# Patient Record
Sex: FEMALE | Race: WHITE | NOT HISPANIC OR LATINO | Employment: FULL TIME | URBAN - METROPOLITAN AREA
[De-identification: names, ages, dates, MRNs, and addresses within clinical notes are randomized per-mention and may not be internally consistent; named-entity substitution may affect disease eponyms.]

---

## 2017-05-29 ENCOUNTER — OFFICE VISIT (OUTPATIENT)
Dept: URGENT CARE | Facility: PHYSICIAN GROUP | Age: 42
End: 2017-05-29
Payer: COMMERCIAL

## 2017-05-29 VITALS
OXYGEN SATURATION: 96 % | HEIGHT: 65 IN | SYSTOLIC BLOOD PRESSURE: 142 MMHG | HEART RATE: 96 BPM | WEIGHT: 270 LBS | TEMPERATURE: 96.9 F | DIASTOLIC BLOOD PRESSURE: 98 MMHG | BODY MASS INDEX: 44.98 KG/M2

## 2017-05-29 DIAGNOSIS — K04.7 DENTAL INFECTION: ICD-10-CM

## 2017-05-29 PROCEDURE — 99204 OFFICE O/P NEW MOD 45 MIN: CPT | Performed by: PHYSICIAN ASSISTANT

## 2017-05-29 RX ORDER — CLINDAMYCIN HYDROCHLORIDE 150 MG/1
450 CAPSULE ORAL 3 TIMES DAILY
Qty: 63 CAP | Refills: 0 | Status: SHIPPED | OUTPATIENT
Start: 2017-05-29 | End: 2017-06-05

## 2017-05-29 RX ORDER — TRAMADOL HYDROCHLORIDE 50 MG/1
50 TABLET ORAL EVERY 6 HOURS PRN
Qty: 15 TAB | Refills: 0 | Status: SHIPPED | OUTPATIENT
Start: 2017-05-29 | End: 2017-11-30

## 2017-05-29 ASSESSMENT — ENCOUNTER SYMPTOMS
CHILLS: 0
WHEEZING: 0
SHORTNESS OF BREATH: 0
SORE THROAT: 0
PALPITATIONS: 0
FEVER: 0
HEADACHES: 0
COUGH: 0

## 2017-05-29 NOTE — MR AVS SNAPSHOT
"        Lesley Sotomayor   2017 9:35 AM   Office Visit   MRN: [id number]    Department:  [facility]   Dept Phone:  [phone]    Description:  Female : 1975   Provider:  Christopher Sewell PA-C           Reason for Visit     Sinus Problem sinus and head pressure, right ear pain and mouth feels infected on and off 1.5weeks      Allergies as of 2017     Allergen Noted Reactions    Penicillins 2017         You were diagnosed with     Dental infection   [157478]         Vital Signs     Blood Pressure Pulse Temperature Height Weight Body Mass Index    142/98 mmHg 96 36.1 °C (96.9 °F) 1.651 m (5' 5\") 122.471 kg (270 lb) 44.93 kg/m2    Oxygen Saturation                   96%           Basic Information     Date Of Birth Sex Race Ethnicity Preferred Language    1975 Female White Non- English      Health Maintenance        Date Due Completion Dates    IMM DTaP/Tdap/Td Vaccine (1 - Tdap) 3/20/1995 ---    PAP SMEAR 3/20/1997 ---    MAMMOGRAM 3/20/2016 ---            Current Immunizations     Influenza Vaccine Quad Inj (Preserved) 2014      Below and/or attached are the medications your provider expects you to take. Review all of your home medications and newly ordered medications with your provider and/or pharmacist. Follow medication instructions as directed by your provider and/or pharmacist. Please keep your medication list with you and share with your provider. Update the information when medications are discontinued, doses are changed, or new medications (including over-the-counter products) are added; and carry medication information at all times in the event of emergency situations     Allergies:  PENICILLINS - (reactions not documented)               Medications  Valid as of: May 29, 2017 - 10:37 AM    Generic Name Brand Name Tablet Size Instructions for use    Clindamycin HCl (Cap) CLEOCIN 150 MG Take 3 Caps by mouth 3 times a day for 7 days.        TraMADol HCl (Tab) " ULTRAM 50 MG Take 1 Tab by mouth every 6 hours as needed.        .                 Medicines prescribed today were sent to:     [id number] - [place], [place] - [address]    [address] [place] [address]    Phone: [phone] Fax: [phone]    Open 24 Hours?: No      Medication refill instructions:       If your prescription bottle indicates you have medication refills left, it is not necessary to call your provider’s office. Please contact your pharmacy and they will refill your medication.    If your prescription bottle indicates you do not have any refills left, you may request refills at any time through one of the following ways: The online [employer] system (except Urgent Care), by calling your provider’s office, or by asking your pharmacy to contact your provider’s office with a refill request. Medication refills are processed only during regular business hours and may not be available until the next business day. Your provider may request additional information or to have a follow-up visit with you prior to refilling your medication.   *Please Note: Medication refills are assigned a new Rx number when refilled electronically. Your pharmacy may indicate that no refills were authorized even though a new prescription for the same medication is available at the pharmacy. Please request the medicine by name with the pharmacy before contacting your provider for a refill.           [employer] Access Code: [email or web address]  Expires: 6/28/2017 10:37 AM    Your email address is not on file at [facility].  Email Addresses are required for you to sign up for [employer], please contact [phone] to verify your personal information and to provide your email address prior to attempting to register for [employer].    Lesley Sotomayor  [address] [place] [address]    [employer]  A secure, online tool to manage your health information     [facility]’s [employer]® is a secure, online tool that connects you to your  personalized health information from the privacy of your home -- day or night - making it very easy for you to manage your healthcare. Once the activation process is completed, you can even access your medical information using the [employer] katherine, which is available for free in the Apple Katherine store or Google Play store.     To learn more about [employer], visit [email or web address]    There are two levels of access available (as shown below):   My Chart Features  [facility]  Specialists [facility]  Urgent  Care Non-[facility] Doctor   Email your healthcare team securely and privately 24/7 X X X    Manage appointments: schedule your next appointment; view details of past/upcoming appointments X      Request prescription refills. X      View recent personal medical records, including lab and immunizations X X X X   View health record, including health history, allergies, medications X X X X   Read reports about your outpatient visits, procedures, consult and ER notes X X X X   See your discharge summary, which is a recap of your hospital and/or ER visit that includes your diagnosis, lab results, and care plan X X  X     How to register for [employer]:  Once your e-mail address has been verified, follow the following steps to sign up for [employer].     1. Go to  [email or web address]  2. Click on the Sign Up Now box, which takes you to the New Member Sign Up page. You will need to provide the following information:  a. Enter your [employer] Access Code exactly as it appears at the top of this page. (You will not need to use this code after you’ve completed the sign-up process. If you do not sign up before the expiration date, you must request a new code.)   b. Enter your date of birth.   c. Enter your home email address.   d. Click Submit, and follow the next screen’s instructions.  3. Create a [employer] ID. This will be your [employer] login ID and cannot be changed, so think of one that is secure and  easy to remember.  4. Create a [employer] password. You can change your password at any time.  5. Enter your Password Reset Question and Answer. This can be used at a later time if you forget your password.   6. Enter your e-mail address. This allows you to receive e-mail notifications when new information is available in [employer].  7. Click Sign Up. You can now view your health information.    For assistance activating your [employer] account, call [phone]

## 2017-05-29 NOTE — PROGRESS NOTES
"Subjective:      Lesley Sotomayor is a 42 y.o. female who presents with Sinus Problem            Dental Pain   This is a new problem. The current episode started in the past 7 days. The problem occurs constantly. The problem has been unchanged. The pain is moderate. Associated symptoms include facial pain. Pertinent negatives include no fever. She has tried NSAIDs for the symptoms. The treatment provided no relief.       Review of Systems   Constitutional: Negative for fever and chills.   HENT: Negative for congestion, ear pain and sore throat.         Mouth pain     Respiratory: Negative for cough, shortness of breath and wheezing.    Cardiovascular: Negative for chest pain and palpitations.   Neurological: Negative for headaches.   All other systems reviewed and are negative.  PMH:  has no past medical history on file.  MEDS:   Current outpatient prescriptions:   •  tramadol (ULTRAM) 50 MG Tab, Take 1 Tab by mouth every 6 hours as needed., Disp: 15 Tab, Rfl: 0  •  clindamycin (CLEOCIN) 150 MG Cap, Take 3 Caps by mouth 3 times a day for 7 days., Disp: 63 Cap, Rfl: 0  ALLERGIES:   Allergies   Allergen Reactions   • Penicillins      SURGHX: History reviewed. No pertinent past surgical history.  SOCHX:    FH: Family history was reviewed, no pertinent findings to report  Medications, Allergies, and current problem list reviewed today in Epic         Objective:     /98 mmHg  Pulse 96  Temp(Src) 36.1 °C (96.9 °F)  Ht 1.651 m (5' 5\")  Wt 122.471 kg (270 lb)  BMI 44.93 kg/m2  SpO2 96%     Physical Exam   Constitutional: She is oriented to person, place, and time. She appears well-developed and well-nourished.   HENT:   Head: Normocephalic and atraumatic.   Right Ear: Hearing, tympanic membrane, external ear and ear canal normal.   Left Ear: Hearing, tympanic membrane, external ear and ear canal normal.   Nose: Nose normal.   Mouth/Throat: Uvula is midline, oropharynx is clear and moist and mucous membranes are " normal. Dental caries present. No dental abscesses.       Neck: Normal range of motion. Neck supple.   Cardiovascular: Normal rate, regular rhythm and normal heart sounds.  Exam reveals no gallop and no friction rub.    No murmur heard.  Pulmonary/Chest: Effort normal and breath sounds normal. No accessory muscle usage. No apnea, no tachypnea and no bradypnea. No respiratory distress. She has no decreased breath sounds. She has no wheezes. She has no rhonchi. She has no rales. She exhibits no tenderness.   Neurological: She is alert and oriented to person, place, and time.   Skin: Skin is warm and dry.   Psychiatric: She has a normal mood and affect. Her behavior is normal. Judgment and thought content normal.   Vitals reviewed.              Assessment/Plan:     1. Dental infection    - tramadol (ULTRAM) 50 MG Tab; Take 1 Tab by mouth every 6 hours as needed.  Dispense: 15 Tab; Refill: 0  - clindamycin (CLEOCIN) 150 MG Cap; Take 3 Caps by mouth 3 times a day for 7 days.  Dispense: 63 Cap; Refill: 0    [facility] Aware web site evaluation: I have obtained and reviewed patient utilization report from [facility] pharmacy database prior to writing prescription for controlled substance II, III or IV per [place] bill . Based on the report and my clinical assessment the prescription is medically necessary.   Follow-up with dentist within 4-5 days, emergency room precautions discussed.  Patient and/or family appears understanding of information.  Differential diagnosis, natural history, supportive care, and indications for immediate follow-up discussed at length.

## 2017-11-30 ENCOUNTER — HOSPITAL ENCOUNTER (OUTPATIENT)
Dept: RADIOLOGY | Facility: MEDICAL CENTER | Age: 42
End: 2017-11-30
Attending: NURSE PRACTITIONER
Payer: COMMERCIAL

## 2017-11-30 ENCOUNTER — OFFICE VISIT (OUTPATIENT)
Dept: MEDICAL GROUP | Facility: PHYSICIAN GROUP | Age: 42
End: 2017-11-30
Payer: COMMERCIAL

## 2017-11-30 VITALS
RESPIRATION RATE: 16 BRPM | WEIGHT: 277 LBS | BODY MASS INDEX: 46.15 KG/M2 | OXYGEN SATURATION: 99 % | TEMPERATURE: 97.7 F | HEART RATE: 93 BPM | SYSTOLIC BLOOD PRESSURE: 124 MMHG | HEIGHT: 65 IN | DIASTOLIC BLOOD PRESSURE: 76 MMHG

## 2017-11-30 DIAGNOSIS — N92.1 MENORRHAGIA WITH IRREGULAR CYCLE: ICD-10-CM

## 2017-11-30 DIAGNOSIS — M25.561 CHRONIC PAIN OF RIGHT KNEE: ICD-10-CM

## 2017-11-30 DIAGNOSIS — G89.29 CHRONIC PAIN OF RIGHT KNEE: ICD-10-CM

## 2017-11-30 DIAGNOSIS — R73.01 ELEVATED FASTING GLUCOSE: ICD-10-CM

## 2017-11-30 DIAGNOSIS — E55.9 VITAMIN D INSUFFICIENCY: ICD-10-CM

## 2017-11-30 DIAGNOSIS — Z76.89 ENCOUNTER TO ESTABLISH CARE: ICD-10-CM

## 2017-11-30 DIAGNOSIS — E66.01 OBESITY, CLASS III, BMI 40-49.9 (MORBID OBESITY) (HCC): ICD-10-CM

## 2017-11-30 DIAGNOSIS — R79.89 ELEVATED TSH: ICD-10-CM

## 2017-11-30 PROCEDURE — 73564 X-RAY EXAM KNEE 4 OR MORE: CPT | Mod: RT

## 2017-11-30 PROCEDURE — 99215 OFFICE O/P EST HI 40 MIN: CPT | Performed by: NURSE PRACTITIONER

## 2017-11-30 RX ORDER — NAPROXEN 500 MG/1
TABLET ORAL
COMMUNITY
Start: 2009-09-11 | End: 2022-09-08

## 2017-11-30 RX ORDER — TETRACYCLINE HCL 500 MG
CAPSULE ORAL
COMMUNITY
End: 2022-09-08

## 2017-11-30 RX ORDER — IBUPROFEN 800 MG/1
800 TABLET ORAL
COMMUNITY
End: 2017-11-30

## 2017-11-30 RX ORDER — METHOCARBAMOL 750 MG/1
TABLET, FILM COATED ORAL
COMMUNITY
Start: 2009-09-11 | End: 2017-12-08

## 2017-11-30 RX ORDER — TRAZODONE HYDROCHLORIDE 50 MG/1
50 TABLET ORAL
COMMUNITY
Start: 2013-12-23 | End: 2022-09-08

## 2017-11-30 RX ORDER — MULTIVITAMIN WITH IRON
TABLET ORAL
COMMUNITY
End: 2022-09-08

## 2017-11-30 RX ORDER — AMOXICILLIN 500 MG
2 CAPSULE ORAL DAILY
COMMUNITY
End: 2022-09-08

## 2017-11-30 ASSESSMENT — PATIENT HEALTH QUESTIONNAIRE - PHQ9: CLINICAL INTERPRETATION OF PHQ2 SCORE: 0

## 2017-11-30 NOTE — ASSESSMENT & PLAN NOTE
Ongoing chronic problem for a few years, currently followed by gynecologist. She has long cycles, up to 21 days, with irregular cycle. US showed uterine polyp and cyst. No fibroids. Gynecology has discussed endometrial ablation, Mirena IUD, or hysterectomy. The patient is requesting me to give her more information on these options.  She is currently on norethindrone to stop menstrual cycle.   Mother had perimenopause start age 35.

## 2017-11-30 NOTE — ASSESSMENT & PLAN NOTE
"New problem to me. Started over the past year, but worsening over the past 3 months. The pain is located generalized in the knee joint. Sometimes it gives out on her. There is stiffness a lot. Reports popping, clicking, and a \"ripping\" noise. Denies any erythema, edema, or warmth. The more she exercises, the more the pain is occurring. Failed brace, ibuprofen, aleve, ice, and heat.   "

## 2017-11-30 NOTE — ASSESSMENT & PLAN NOTE
Ongoing chronic problem. Worse recently with knee pain with 7 lb weight gain  Over 7 months. She has joined a gym, but her right knee pain is halting her from exercising as much. Working on decreasing carbohydrates, but reports does really love breads/pastas. She is drinking more water, but this is difficult for her. She has never had nutritional counseling.   24 hour diet recall: cutie orange today, cereal for dinner last night frosted shredded wheat cereal, hot dog for lunch yesterday, and cutie for breakfast yesterday

## 2017-12-01 NOTE — PROGRESS NOTES
"Chief Complaint   Patient presents with   • New Patient     establish care, discuss weight, abnormal labs, right knee pain       HPI:    Lesley Sotomayor is a 42 y.o. female here to establish care.She has been following gynecology alone for many years now, but recently has had some abnormalities that have triggered her to establish with a PCP. Was seen in our urgent care this summer.     Recent labs reveal elevated fasting glucose level of 103. She denies any polyuria or polydipsia. No diagnosis or history of diabetes    Right knee pain  New problem to me. Started over the past year, but worsening over the past 3 months. The pain is located generalized in the knee joint. Sometimes it gives out on her. There is stiffness a lot. Reports popping, clicking, and a \"ripping\" noise. Denies any erythema, edema, or warmth. The more she exercises, the more the pain is occurring. Failed brace, ibuprofen, aleve, ice, and heat.     Menorrhagia with irregular cycle  Ongoing chronic problem for a few years, currently followed by gynecologist. She has long cycles, up to 21 days, with irregular cycle. US showed uterine polyp and cyst. No fibroids. Gynecology has discussed endometrial ablation, Mirena IUD, or hysterectomy. The patient is requesting me to give her more information on these options.  She is currently on norethindrone to stop menstrual cycle.   Mother had perimenopause start age 35.     Obesity, Class III, BMI 40-49.9 (morbid obesity) (CMS-HCC)  Ongoing chronic problem. Worse recently with knee pain with 7 lb weight gain  Over 7 months. She has joined a gym, but her right knee pain is halting her from exercising as much. Working on decreasing carbohydrates, but reports does really love breads/pastas. She is drinking more water, but this is difficult for her. She has never had nutritional counseling.   24 hour diet recall: cutie orange today, cereal for dinner last night frosted shredded wheat cereal, hot dog for lunch " yesterday, and cutie for breakfast yesterday     Elevated TSH  Recent labs from one month ago reveal elevated TSH level of 7.560. She denies any heat or cold intolerance, change in hair, skin, or nails, or excessive fatigue. She is having extremely long and heavy periods though. Also having weight gain. Denies any known thyroid disease in the family.    Vitamin D insufficiency  Recent labs reveal vitamin D insufficiency level 26.1. She has started OTC vitamin D 5000 units on her own    Current medicines (including changes today)  Current Outpatient Prescriptions   Medication Sig Dispense Refill   • naproxen (NAPROSYN) 500 MG Tab Take  by mouth.     • methocarbamol (ROBAXIN-750) 750 MG Tab Take  by mouth.     • trazodone (DESYREL) 50 MG Tab Take 50 mg by mouth.     • norethindrone (AYGESTIN) 5 MG tablet      • Omega-3 Fatty Acids (FISH OIL) 1200 MG Cap Take 2 Caps by mouth every day.     • GLUCOSAMINE HCL-MSM PO Take  by mouth.     • Cholecalciferol (VITAMIN D3) 5000 units TABLET DISPERSIBLE Take  by mouth.     • Apple Cider Vinegar 500 MG Tab Take  by mouth.     • Multiple Vitamins-Minerals (WOMENS MULTIVITAMIN PLUS) Tab Take  by mouth.     • Pyridoxine HCl (VITAMIN B-6) 100 MG Tab Take  by mouth.       No current facility-administered medications for this visit.      She  has a past medical history of Migraine. She also has no past medical history of Anxiety; Depression; Hyperlipidemia; or Hypertension.  She  has a past surgical history that includes primary c section.  Social History   Substance Use Topics   • Smoking status: Former Smoker     Packs/day: 0.25     Years: 25.00     Types: Cigarettes     Quit date: 11/5/2017   • Smokeless tobacco: Never Used   • Alcohol use Yes      Comment: rarely      Social History     Social History Narrative    . Has 3 children who live with her. Works full time as .      Family History   Problem Relation Age of Onset   • Diabetes Mother    • Hypertension  "Mother    • Hypertension Father    • No Known Problems Brother    • Diabetes Maternal Aunt    • Diabetes Maternal Grandmother    • Heart Attack Maternal Grandfather    • Heart Failure Paternal Grandmother    • No Known Problems Brother    • No Known Problems Daughter    • No Known Problems Daughter    • No Known Problems Son      Family Status   Relation Status   • Mother Alive   • Father Alive   • Brother Alive   • Maternal Aunt    • Maternal Grandmother Alive   • Maternal Grandfather    • Paternal Grandmother    • Brother Alive   • Daughter Alive   • Daughter Alive   • Son Alive     Health Maintenance Topics with due status: Overdue       Topic Date Due    IMM DTaP/Tdap/Td Vaccine 1995    PAP SMEAR 1997    IMM INFLUENZA 2017        ROS  See form completed by patient, reviewed by me and no changes necessary. Scanned into chart.   Pertinent positives:  HEENT: +headaches, sneezing, rhinorrhea  Neck: +stiffness and thyroid disorder  CV: +sob when flat, swelling of hands/feet intermittently  MSK: +varicose veins, difficulty walking d/t right knee pain  Heme: +abnormal bruising  Allergies: hives to PCN  All other systems reviewed by me and are negative.      Objective:     Blood pressure 124/76, pulse 93, temperature 36.5 °C (97.7 °F), resp. rate 16, height 1.651 m (5' 5\"), weight (!) 125.6 kg (277 lb), SpO2 99 %. Body mass index is 46.1 kg/m².  Physical Exam:  Alert, oriented in no acute distress.  Eye contact is good, speech goal directed, affect bright.  HEENT: EOMI, PERRL, conjunctiva non-injected, sclera non-icteric. No lid edema or eye drainage  Nares patent with no significant congestion or drainage.   Gross hearing intact   Zohreh pinna, external auditory canals, TM pearly gray with normal light reflex bilaterally.  Oral mucous membranes pink and moist with no lesions. Oropharynx without erythema or exudate  Neck: supple with no cervical or supraclavicular lymphadenopathy, " palpable thyroid nodules or thyromegaly.  Lungs: unlabored, clear to auscultation bilaterally with good excursion.  CV: regular rate and rhythm, no murmurs, no carotid bruits.  Lower extremities color normal, vascularity normal, no edema, temperature normal  MSK: right knee Inspection: +edema lateral knee. Full unrestricted symmetric ROM. No crepitus.   No palpable effusions. + lateral joint line tenderness.   Alignment is normal.    Ligaments: anterior drawer test negative, posterior drawer test negative, varus and valgus tests negative  McMurrary's test positive  Strength 5/5 Quadriceps, hamstrings, extensor hallius longus, tibialis anterior, gastroc.   Able to bear full weight.   Skin: No rashes or lesions in visible areas  Neuro: CNs grossly intact. Gait steady. Strength all extremities 5/5.         Assessment and Plan:   Assessment/Plan:  1. Encounter to establish care    2. Chronic pain of right knee  Suspect possible meniscal injury. Obtain x-ray now. Likely will need MRI  - DX-KNEE COMPLETE 4+ RIGHT; Future    3. Obesity, Class III, BMI 40-49.9 (morbid obesity) (CMS-HCC)  Not controlled. Discussed option of referral to nutrition counseling. She would like to address her knee pain which will then allow her to get back into physical activity.  - Patient identified as having weight management issue.  Appropriate orders and counseling given.    4. Elevated TSH  TSH is elevated and she does have symptoms. We will repeat labs in about 2 weeks. If TSH >7, discussed that I will start levothyroxine due to her symptoms  - THYROID PEROXIDASE  (TPO) AB; Future  - TSH WITH REFLEX TO FT4; Future    5. Menorrhagia with irregular cycle  Educated patient on ablation, IUD, and hysterectomy risks versus benefits. All questions answered. Follow-up with gynecology    6. Vitamin D insufficiency  Continue OTC vitamin D 5000 units daily. We will check labs in 6 months    7. Elevated fasting glucose  Check follow-up fasting glucose  and hemoglobin A1c.  - HEMOGLOBIN A1C; Future  - BLOOD GLUCOSE; Future    The total time spent seeing the patient in consultation, and formulating an action plan for this visit was 40 minutes.  Greater than 50% of this time was spent face to face counseling, discussing problems documented above, coordinating care and answering questions by the provider.        Follow up:  Return pending labs .    Educated in proper administration of medication(s) ordered today including safety, possible SE, risks, benefits, rationale and alternatives to therapy.   Supportive care, differential diagnoses, and indications for immediate follow-up discussed with patient.    Pathogenesis of diagnosis discussed including typical length and natural progression.    Instructed to return to clinic or nearest emergency department for any change in condition, further concerns, or worsening of symptoms.  Patient states understanding of the plan of care and discharge instructions.    Please note that this dictation was created using voice recognition software. I have made every reasonable attempt to correct obvious errors, but I expect that there are errors of grammar and possibly content that I did not discover before finalizing the note.    Followup: Return pending labs . sooner should new symptoms or problems arise.

## 2017-12-01 NOTE — ASSESSMENT & PLAN NOTE
Recent labs from one month ago reveal elevated TSH level of 7.560. She denies any heat or cold intolerance, change in hair, skin, or nails, or excessive fatigue. She is having extremely long and heavy periods though. Also having weight gain. Denies any known thyroid disease in the family.

## 2017-12-01 NOTE — ASSESSMENT & PLAN NOTE
Recent labs reveal vitamin D insufficiency level 26.1. She has started OTC vitamin D 5000 units on her own

## 2017-12-08 ENCOUNTER — OFFICE VISIT (OUTPATIENT)
Dept: URGENT CARE | Facility: PHYSICIAN GROUP | Age: 42
End: 2017-12-08
Payer: COMMERCIAL

## 2017-12-08 VITALS
HEIGHT: 65 IN | RESPIRATION RATE: 18 BRPM | SYSTOLIC BLOOD PRESSURE: 122 MMHG | HEART RATE: 112 BPM | TEMPERATURE: 98.7 F | OXYGEN SATURATION: 96 % | BODY MASS INDEX: 46.15 KG/M2 | WEIGHT: 277 LBS | DIASTOLIC BLOOD PRESSURE: 74 MMHG

## 2017-12-08 DIAGNOSIS — M54.50 ACUTE MIDLINE LOW BACK PAIN WITHOUT SCIATICA: ICD-10-CM

## 2017-12-08 LAB
APPEARANCE UR: CLEAR
BILIRUB UR STRIP-MCNC: NORMAL MG/DL
COLOR UR AUTO: YELLOW
GLUCOSE UR STRIP.AUTO-MCNC: NORMAL MG/DL
KETONES UR STRIP.AUTO-MCNC: NORMAL MG/DL
LEUKOCYTE ESTERASE UR QL STRIP.AUTO: NORMAL
NITRITE UR QL STRIP.AUTO: NORMAL
PH UR STRIP.AUTO: 6 [PH] (ref 5–8)
PROT UR QL STRIP: NORMAL MG/DL
RBC UR QL AUTO: NORMAL
SP GR UR STRIP.AUTO: 1.01
UROBILINOGEN UR STRIP-MCNC: NORMAL MG/DL

## 2017-12-08 PROCEDURE — 99214 OFFICE O/P EST MOD 30 MIN: CPT | Performed by: PHYSICIAN ASSISTANT

## 2017-12-08 PROCEDURE — 81002 URINALYSIS NONAUTO W/O SCOPE: CPT | Performed by: PHYSICIAN ASSISTANT

## 2017-12-08 RX ORDER — CYCLOBENZAPRINE HCL 5 MG
5-10 TABLET ORAL 3 TIMES DAILY PRN
Qty: 30 TAB | Refills: 0 | Status: SHIPPED | OUTPATIENT
Start: 2017-12-08 | End: 2022-09-08

## 2017-12-08 RX ORDER — KETOROLAC TROMETHAMINE 30 MG/ML
60 INJECTION, SOLUTION INTRAMUSCULAR; INTRAVENOUS ONCE
Status: COMPLETED | OUTPATIENT
Start: 2017-12-08 | End: 2017-12-08

## 2017-12-08 RX ADMIN — KETOROLAC TROMETHAMINE 60 MG: 30 INJECTION, SOLUTION INTRAMUSCULAR; INTRAVENOUS at 13:34

## 2017-12-08 ASSESSMENT — PAIN SCALES - GENERAL: PAINLEVEL: 6=MODERATE PAIN

## 2017-12-09 ASSESSMENT — ENCOUNTER SYMPTOMS
TINGLING: 0
CHILLS: 0
BOWEL INCONTINENCE: 0
VOMITING: 0
SHORTNESS OF BREATH: 0
FEVER: 0
PERIANAL NUMBNESS: 0
DIARRHEA: 0
DIZZINESS: 0
BACK PAIN: 1
NUMBNESS: 0
ABDOMINAL PAIN: 0
NAUSEA: 0

## 2017-12-09 NOTE — PROGRESS NOTES
"Subjective:      Lesley Sotomayor is a 42 y.o. female who presents with Low Back Pain (Low Back Pain x5 days. Slight Burning when using bathroom )            Back Pain   This is a new problem. The current episode started in the past 7 days (3-4 days). The problem occurs constantly. The problem is unchanged. The pain is present in the lumbar spine. The quality of the pain is described as aching. The pain does not radiate. The pain is at a severity of 2/10. The pain is mild. Associated symptoms include dysuria. Pertinent negatives include no abdominal pain, bladder incontinence, bowel incontinence, chest pain, fever, numbness, perianal numbness or tingling.     Patient presents to urgent care reporting lower back pain x 3-4 days. She reports she feels a slight burning with urination but that it doesn't feel like UTIs like she has had in the past. No flank pain, abdominal pain, hematuria, or history of kidney stones.     Review of Systems   Constitutional: Negative for chills and fever.   HENT: Negative for congestion.    Respiratory: Negative for shortness of breath.    Cardiovascular: Negative for chest pain.   Gastrointestinal: Negative for abdominal pain, bowel incontinence, diarrhea, nausea and vomiting.   Genitourinary: Positive for dysuria. Negative for bladder incontinence, frequency, hematuria and urgency.   Musculoskeletal: Positive for back pain.   Neurological: Negative for dizziness, tingling and numbness.      Objective:     /74   Pulse (!) 112   Temp 37.1 °C (98.7 °F)   Resp 18   Ht 1.651 m (5' 5\")   Wt (!) 125.6 kg (277 lb)   SpO2 96%   Breastfeeding? No   BMI 46.10 kg/m²      Physical Exam   Constitutional: She is oriented to person, place, and time. She appears well-developed and well-nourished. No distress.   HENT:   Head: Normocephalic and atraumatic.   Eyes: Conjunctivae are normal. Pupils are equal, round, and reactive to light. Right eye exhibits no discharge. Left eye exhibits no " discharge.   Neck: Normal range of motion.   Cardiovascular: Normal rate.    Pulmonary/Chest: Effort normal.   Abdominal: Soft. Normal appearance and bowel sounds are normal. She exhibits no distension and no mass. There is no tenderness. There is no guarding.   No CVAT bilaterally   Musculoskeletal: Normal range of motion.        Lumbar back: She exhibits pain. She exhibits normal range of motion, no tenderness, no bony tenderness, no deformity and no laceration.   Neurological: She is alert and oriented to person, place, and time.   Skin: Skin is warm and dry. She is not diaphoretic.   Psychiatric: She has a normal mood and affect. Her behavior is normal.   Nursing note and vitals reviewed.         POCT Urinalysis:  Component Results     Component Value Ref Range & Units Status   POC Color yellow Negative Final   POC Appearance clear Negative Final   POC Leukocyte Esterase neg Negative Final   POC Nitrites neg Negative Final   POC Urobiligen neg Negative (0.2) mg/dL Final   POC Protein neg Negative mg/dL Final   POC Urine PH 6.0 5.0 - 8.0 Final   POC Blood large Negative Final   POC Specific Gravity 1.015 <1.005 - >1.030 Final   POC Ketones neg Negative mg/dL Final   POC Biliruben neg Negative mg/dL Final   POC Glucose neg Negative mg/dL Final   Last Resulted Time   Fri Dec 8, 2017  1:04 PM      Assessment/Plan:     1. Acute midline low back pain without sciatica  - POCT Urinalysis --> + blood, otherwise normal  - ketorolac (TORADOL) injection 60 mg; 2 mL by Intramuscular route Once.   - Given in clinic with mild relief of symptoms  - cyclobenzaprine (FLEXERIL) 5 MG tablet; Take 1-2 Tabs by mouth 3 times a day as needed.  Dispense: 30 Tab; Refill: 0   - Will cause sedation, avoid driving, operating heavy machinery, and drinking alcohol    Patient does have + blood in her urine at today's visit. I had a long discussion with patient regarding possibility of kidney stones, although her pain is more midline and she is  not having any CVAT or abdominal pain. No history of kidney stones. I think it's most likely musculoskeletal low back pain and advised I don't want to expose her to radiation with CT imaging for possible stones unless her symptoms continue to worsen.  At this point she is encouraged to ice/heat the back 2-3 times daily and take OTC nsaids as needed for the pain. Gentle stretching and massage. She should RTC for any persistent/worsening symptoms. The patient demonstrated a good understanding and agreed with the treatment plan.

## 2022-09-08 ENCOUNTER — OFFICE VISIT (OUTPATIENT)
Dept: URGENT CARE | Facility: PHYSICIAN GROUP | Age: 47
End: 2022-09-08
Payer: COMMERCIAL

## 2022-09-08 ENCOUNTER — HOSPITAL ENCOUNTER (OUTPATIENT)
Dept: RADIOLOGY | Facility: MEDICAL CENTER | Age: 47
End: 2022-09-08
Attending: NURSE PRACTITIONER
Payer: COMMERCIAL

## 2022-09-08 VITALS
RESPIRATION RATE: 17 BRPM | TEMPERATURE: 97.5 F | HEART RATE: 80 BPM | WEIGHT: 284 LBS | DIASTOLIC BLOOD PRESSURE: 90 MMHG | HEIGHT: 64 IN | SYSTOLIC BLOOD PRESSURE: 148 MMHG | OXYGEN SATURATION: 97 % | BODY MASS INDEX: 48.49 KG/M2

## 2022-09-08 DIAGNOSIS — R03.0 ELEVATED BLOOD PRESSURE READING: ICD-10-CM
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
## 2022-09-08 DIAGNOSIS — S86.912A STRAIN OF LEFT KNEE, INITIAL ENCOUNTER: ICD-10-CM

## 2022-09-08 DIAGNOSIS — S89.92XA INJURY OF LEFT KNEE, INITIAL ENCOUNTER: ICD-10-CM

## 2022-09-08 DIAGNOSIS — M17.12 OSTEOARTHRITIS OF LEFT KNEE, UNSPECIFIED OSTEOARTHRITIS TYPE: ICD-10-CM

## 2022-09-08 PROCEDURE — 73562 X-RAY EXAM OF KNEE 3: CPT | Mod: LT

## 2022-09-08 PROCEDURE — 99214 OFFICE O/P EST MOD 30 MIN: CPT | Performed by: NURSE PRACTITIONER

## 2022-09-08 RX ORDER — LISINOPRIL 10 MG/1
10 TABLET ORAL
COMMUNITY
Start: 2022-08-02

## 2022-09-08 NOTE — PROGRESS NOTES
"Lesley Sotomayor is a 47 y.o. female who presents for Injury (Left knee, at the gym last night at approx. 5:15pm)    Accompanied by her .   HPI yesterday she was doing small 12\" box jumps at the gym. She jumpped off box and  fell down to the ground. Her Left knee immediately started to hurt. She could bend her knee but she could not bear weight on her knee. She had to have assisytance getting up from the ground. She has to lean on her family to help her walk. Last night she elevated and iced her knee. This morning she put on a knee brace that her dtr bought for her.   No previous injuries to knee in the past. She has been taking ibuprofen.  Pain 7/10 while sitting. Getting up causes pain > 10/10. No other aggravating or alleviating factors.         ROS    Allergies:       Allergies   Allergen Reactions    Penicillins Hives       PMSFS Hx:  Past Medical History:   Diagnosis Date    Migraine      Past Surgical History:   Procedure Laterality Date    PRIMARY C SECTION      3 total     Family History   Problem Relation Age of Onset    Diabetes Mother     Hypertension Mother     Hypertension Father     No Known Problems Brother     Diabetes Maternal Aunt     Diabetes Maternal Grandmother     Heart Attack Maternal Grandfather     Heart Failure Paternal Grandmother     No Known Problems Brother     No Known Problems Daughter     No Known Problems Daughter     No Known Problems Son      Social History     Tobacco Use    Smoking status: Former     Packs/day: 0.25     Years: 25.00     Pack years: 6.25     Types: Cigarettes     Quit date: 2017     Years since quittin.8    Smokeless tobacco: Never   Substance Use Topics    Alcohol use: Yes     Comment: rarely        Problems:   Patient Active Problem List   Diagnosis    Obesity, Class III, BMI 40-49.9 (morbid obesity) ([facility])    Right knee pain    Elevated TSH    Vitamin D insufficiency    Menorrhagia with irregular cycle       Medications:   Current Outpatient " "Medications on File Prior to Visit   Medication Sig Dispense Refill    metoprolol tartrate (LOPRESSOR) 25 MG Tab Take 25 mg by mouth every day.      lisinopril (PRINIVIL) 10 MG Tab Take 10 mg by mouth every day.      cyclobenzaprine (FLEXERIL) 5 MG tablet Take 1-2 Tabs by mouth 3 times a day as needed. (Patient not taking: No sig reported) 30 Tab 0     No current facility-administered medications on file prior to visit.          Objective:     BP (!) 148/90 (BP Location: Left arm, Patient Position: Sitting, BP Cuff Size: Large adult)   Pulse 80   Temp 36.4 °C (97.5 °F) (Temporal)   Resp 17   Ht 1.626 m (5' 4\")   Wt (!) 129 kg (284 lb)   SpO2 97%   BMI 48.75 kg/m²     Physical Exam  Vitals and nursing note reviewed.   Constitutional:       General: She is not in acute distress.     Appearance: Normal appearance. She is well-developed. She is not toxic-appearing.   HENT:      Head: Normocephalic.   Cardiovascular:      Rate and Rhythm: Normal rate and regular rhythm.      Pulses: Normal pulses.   Pulmonary:      Effort: Pulmonary effort is normal.   Musculoskeletal:      Left knee: Swelling present. No erythema, ecchymosis or bony tenderness. Decreased range of motion. No tenderness.        Legs:    Skin:     General: Skin is warm and dry.      Capillary Refill: Capillary refill takes less than 2 seconds.   Neurological:      General: No focal deficit present.      Mental Status: She is alert and oriented to person, place, and time.   Psychiatric:         Mood and Affect: Mood normal.         Behavior: Behavior normal. Behavior is cooperative.         Thought Content: Thought content normal.         RADIOLOGY RESULTS   DX-KNEE 3 VIEWS LEFT    Result Date: 9/8/2022 9/8/2022 10:26 AM HISTORY/REASON FOR EXAM:  Persistent left anterior knee pain after the knee \"gave out\" at the gym last night. TECHNIQUE/EXAM DESCRIPTION AND NUMBER OF VIEWS:  3 views of the LEFT knee. COMPARISON: None FINDINGS: There is no " significant joint effusion. There is no evidence of displaced  fracture or dislocation. There is mild degenerative marginal spurring in the medial compartment and posterior patellofemoral compartment of the left knee. The joint spaces are preserved.     1.  No fracture or malalignment of the left knee.   2.  There is mild degenerative change in the medial and patellofemoral compartments.          Xray: Reviewed and interpreted independently by me. I agree with the radiologist's findings.     Assessment /Associated Orders:      1. Strain of left knee, initial encounter        2. Injury of left knee, initial encounter  DX-KNEE 3 VIEWS LEFT      3. Fall (on) (from) other stairs and steps, initial encounter        4. Osteoarthritis of left knee, unspecified osteoarthritis type        5. Elevated blood pressure reading              Medical Decision Making:    Pt is clinically stable at today's acute urgent care visit.  No acute distress noted. Appropriate for outpatient care at this time.   Acute problem today .     Hinged knee brace.     OTC  analgesic of choice (acetaminophen or NSAID) prn pain. Follow manufactures dosing and safety precautions.     Activity as tolerated     Ice prn      Heat pack prn      Rest      Elevation      Keep well hydrated.       Avoid being too sedentary. DVT risk reduction discussed with pt and her spouse. Neg Hx VTE.    Discussed Dx, management options (risks,benefits, and alternatives to planned treatment), natural progression and supportive care.  Expressed understanding and the treatment plan was agreed upon.   Questions were encouraged and answered   Return to urgent care prn if new or worsening sx or if there is no improvement in condition prn.    Educated in Red flags and indications to immediately call 911 or present to the Emergency Department.       Time I spent evaluating Lesley Sotomayor in urgent care today was 33  minutes. This time includes preparing for visit, reviewing any  pertinent notes or test results, counseling/education, exam, obtaining HPI, interpretation of lab tests, medication management and documentation as indicated above.Time does not include separately billable procedures noted .       Please note that this dictation was created using voice recognition software. I have worked with consultants from the vendor as well as technical experts from [facility] to optimize the interface. I have made every reasonable attempt to correct obvious errors, but I expect that there are errors of grammar and possibly content that I did not discover before finalizing the note.  This note was electronically signed by provider